# Patient Record
Sex: FEMALE | Race: WHITE | Employment: FULL TIME | URBAN - METROPOLITAN AREA
[De-identification: names, ages, dates, MRNs, and addresses within clinical notes are randomized per-mention and may not be internally consistent; named-entity substitution may affect disease eponyms.]

---

## 2023-03-28 ENCOUNTER — OFFICE VISIT (OUTPATIENT)
Dept: FAMILY MEDICINE CLINIC | Facility: CLINIC | Age: 48
End: 2023-03-28
Payer: COMMERCIAL

## 2023-03-28 VITALS
TEMPERATURE: 98 F | BODY MASS INDEX: 27.33 KG/M2 | WEIGHT: 139.19 LBS | SYSTOLIC BLOOD PRESSURE: 124 MMHG | OXYGEN SATURATION: 97 % | RESPIRATION RATE: 18 BRPM | DIASTOLIC BLOOD PRESSURE: 78 MMHG | HEART RATE: 72 BPM | HEIGHT: 60 IN

## 2023-03-28 DIAGNOSIS — J01.00 ACUTE MAXILLARY SINUSITIS, RECURRENCE NOT SPECIFIED: Primary | ICD-10-CM

## 2023-03-28 PROCEDURE — 3078F DIAST BP <80 MM HG: CPT | Performed by: NURSE PRACTITIONER

## 2023-03-28 PROCEDURE — 3074F SYST BP LT 130 MM HG: CPT | Performed by: NURSE PRACTITIONER

## 2023-03-28 PROCEDURE — 3008F BODY MASS INDEX DOCD: CPT | Performed by: NURSE PRACTITIONER

## 2023-03-28 PROCEDURE — 99203 OFFICE O/P NEW LOW 30 MIN: CPT | Performed by: NURSE PRACTITIONER

## 2023-03-28 RX ORDER — METOPROLOL SUCCINATE 25 MG/1
25 TABLET, EXTENDED RELEASE ORAL DAILY
COMMUNITY
Start: 2022-10-10

## 2023-03-28 RX ORDER — TRIAMCINOLONE ACETONIDE 1 MG/G
1 CREAM TOPICAL 2 TIMES DAILY
COMMUNITY
Start: 2022-10-10

## 2023-03-28 RX ORDER — AMOXICILLIN AND CLAVULANATE POTASSIUM 875; 125 MG/1; MG/1
1 TABLET, FILM COATED ORAL 2 TIMES DAILY
Qty: 20 TABLET | Refills: 0 | Status: SHIPPED | OUTPATIENT
Start: 2023-03-28 | End: 2023-04-07

## 2023-03-28 RX ORDER — ALPRAZOLAM 0.25 MG/1
0.25 TABLET ORAL AS DIRECTED
COMMUNITY

## 2023-03-28 RX ORDER — BUPROPION HYDROCHLORIDE 150 MG/1
150 TABLET ORAL EVERY MORNING
COMMUNITY
Start: 2021-02-18

## 2023-04-15 ENCOUNTER — APPOINTMENT (OUTPATIENT)
Dept: GENERAL RADIOLOGY | Age: 48
End: 2023-04-15
Attending: EMERGENCY MEDICINE

## 2023-04-15 ENCOUNTER — HOSPITAL ENCOUNTER (EMERGENCY)
Age: 48
Discharge: HOME | End: 2023-04-15
Attending: EMERGENCY MEDICINE

## 2023-04-15 VITALS
HEIGHT: 60 IN | HEART RATE: 68 BPM | TEMPERATURE: 98.6 F | DIASTOLIC BLOOD PRESSURE: 87 MMHG | SYSTOLIC BLOOD PRESSURE: 117 MMHG | BODY MASS INDEX: 27.57 KG/M2 | RESPIRATION RATE: 16 BRPM | WEIGHT: 140.43 LBS | OXYGEN SATURATION: 97 %

## 2023-04-15 DIAGNOSIS — R07.9 CHEST PAIN, UNSPECIFIED TYPE: Primary | ICD-10-CM

## 2023-04-15 LAB
ALBUMIN SERPL-MCNC: 3.5 G/DL (ref 3.6–5.1)
ALBUMIN/GLOB SERPL: 1.1 {RATIO} (ref 1–2.4)
ALP SERPL-CCNC: 77 UNITS/L (ref 45–117)
ALT SERPL-CCNC: 25 UNITS/L
ANION GAP SERPL CALC-SCNC: 6 MMOL/L (ref 7–19)
AST SERPL-CCNC: 24 UNITS/L
BASOPHILS # BLD: 0 K/MCL (ref 0–0.3)
BASOPHILS NFR BLD: 0 %
BILIRUB SERPL-MCNC: 0.4 MG/DL (ref 0.2–1)
BUN SERPL-MCNC: 12 MG/DL (ref 6–20)
BUN/CREAT SERPL: 18 (ref 7–25)
CALCIUM SERPL-MCNC: 8.9 MG/DL (ref 8.4–10.2)
CHLORIDE SERPL-SCNC: 111 MMOL/L (ref 97–110)
CO2 SERPL-SCNC: 24 MMOL/L (ref 21–32)
CREAT SERPL-MCNC: 0.67 MG/DL (ref 0.51–0.95)
DEPRECATED RDW RBC: 40.4 FL (ref 39–50)
EOSINOPHIL # BLD: 0.2 K/MCL (ref 0–0.5)
EOSINOPHIL NFR BLD: 4 %
ERYTHROCYTE [DISTWIDTH] IN BLOOD: 11.9 % (ref 11–15)
FASTING DURATION TIME PATIENT: ABNORMAL H
GFR SERPLBLD BASED ON 1.73 SQ M-ARVRAT: >90 ML/MIN
GLOBULIN SER-MCNC: 3.1 G/DL (ref 2–4)
GLUCOSE SERPL-MCNC: 112 MG/DL (ref 70–99)
HCT VFR BLD CALC: 39.8 % (ref 36–46.5)
HGB BLD-MCNC: 13.6 G/DL (ref 12–15.5)
IMM GRANULOCYTES # BLD AUTO: 0 K/MCL (ref 0–0.2)
IMM GRANULOCYTES # BLD: 0 %
LYMPHOCYTES # BLD: 2.1 K/MCL (ref 1–4.8)
LYMPHOCYTES NFR BLD: 32 %
MCH RBC QN AUTO: 31.8 PG (ref 26–34)
MCHC RBC AUTO-ENTMCNC: 34.2 G/DL (ref 32–36.5)
MCV RBC AUTO: 93 FL (ref 78–100)
MONOCYTES # BLD: 0.5 K/MCL (ref 0.3–0.9)
MONOCYTES NFR BLD: 8 %
NEUTROPHILS # BLD: 3.6 K/MCL (ref 1.8–7.7)
NEUTROPHILS NFR BLD: 56 %
NRBC BLD MANUAL-RTO: 0 /100 WBC
PLATELET # BLD AUTO: 250 K/MCL (ref 140–450)
POTASSIUM SERPL-SCNC: 4 MMOL/L (ref 3.4–5.1)
PROT SERPL-MCNC: 6.6 G/DL (ref 6.4–8.2)
RAINBOW EXTRA TUBES HOLD SPECIMEN: NORMAL
RAINBOW EXTRA TUBES HOLD SPECIMEN: NORMAL
RBC # BLD: 4.28 MIL/MCL (ref 4–5.2)
SODIUM SERPL-SCNC: 137 MMOL/L (ref 135–145)
TROPONIN I SERPL DL<=0.01 NG/ML-MCNC: 6 NG/L
WBC # BLD: 6.6 K/MCL (ref 4.2–11)

## 2023-04-15 PROCEDURE — 99285 EMERGENCY DEPT VISIT HI MDM: CPT

## 2023-04-15 PROCEDURE — 80053 COMPREHEN METABOLIC PANEL: CPT | Performed by: EMERGENCY MEDICINE

## 2023-04-15 PROCEDURE — 85025 COMPLETE CBC W/AUTO DIFF WBC: CPT | Performed by: EMERGENCY MEDICINE

## 2023-04-15 PROCEDURE — 71045 X-RAY EXAM CHEST 1 VIEW: CPT

## 2023-04-15 PROCEDURE — 36415 COLL VENOUS BLD VENIPUNCTURE: CPT

## 2023-04-15 PROCEDURE — 93005 ELECTROCARDIOGRAM TRACING: CPT | Performed by: EMERGENCY MEDICINE

## 2023-04-15 PROCEDURE — 84484 ASSAY OF TROPONIN QUANT: CPT | Performed by: EMERGENCY MEDICINE

## 2023-04-15 RX ORDER — CITALOPRAM HYDROBROMIDE 10 MG/1
10 TABLET ORAL
COMMUNITY

## 2023-04-15 ASSESSMENT — HEART SCORE
EKG: NORMAL
TROPONIN: EQUAL OR LESS THAN NORMAL LIMIT
HEART SCORE: 1
RISK FACTORS: 1-2 RISK FACTORS
HISTORY: SLIGHTLY SUSPICIOUS
AGE: LESS THAN OR EQUAL TO 45

## 2023-04-17 LAB
ATRIAL RATE (BPM): 59
P AXIS (DEGREES): 56
PR-INTERVAL (MSEC): 144
QRS-INTERVAL (MSEC): 82
QT-INTERVAL (MSEC): 432
QTC: 428
R AXIS (DEGREES): 30
REPORT TEXT: NORMAL
T AXIS (DEGREES): 29
VENTRICULAR RATE EKG/MIN (BPM): 59

## 2024-06-20 ENCOUNTER — HOSPITAL ENCOUNTER (OUTPATIENT)
Dept: CT IMAGING | Age: 49
Discharge: HOME | End: 2024-06-20
Attending: INTERNAL MEDICINE

## 2024-06-20 DIAGNOSIS — Z13.6 SCREENING FOR HEART DISEASE: ICD-10-CM

## 2024-06-20 LAB
POCT GLUCOSE CHOLESTECH: 82 (ref 70–99)
POCT HDL: 46 (ref 55–60)
POCT LDL: 148 (ref 0–99)
POCT TOTAL CHOLESTEROL: 218 (ref 110–200)
POCT TRIGLYCERIDES: 121 (ref 1–149)

## 2025-01-10 NOTE — PROGRESS NOTES
HPI:   Carmencita Echeverria is a 49 year old female who presents for a complete physical exam.     Patient presents with:  Physical: Reviewed Preventative/Wellness form with patient.   Patient also here for follow-up hypertension, request refill of her medication, she states she has been on metoprolol for years without changes, she states that she does have occasional lightheadedness            See ROS below for other pertinent positive and negative complaints    I reviewed her's Past Medical History, Past Surgical History, Family and Social History     Labs:   No results found for: \"WBC\", \"HGB\", \"PLT\"   No results found for: \"GLU\", \"NA\", \"K\", \"CL\", \"CO2\", \"CREATSERUM\", \"CA\", \"ALB\", \"TP\", \"ALKPHO\", \"AST\", \"ALT\", \"BILT\", \"TSH\", \"T4F\"     Lab Results   Component Value Date/Time    CHOLEST 218 (A) 06/20/2024 02:49 PM    HDL 46 (A) 06/20/2024 02:49 PM    TRIG 121 06/20/2024 02:49 PM     (A) 06/20/2024 02:49 PM         Current Outpatient Medications   Medication Sig Dispense Refill    naproxen 500 MG Oral Tab Take 1 tablet (500 mg total) by mouth 2 (two) times daily with meals.      metoprolol succinate ER 25 MG Oral Tablet 24 Hr Take 1 tablet (25 mg total) by mouth daily. 90 tablet 2    ALPRAZolam 0.25 MG Oral Tab Take 1 tablet (0.25 mg total) by mouth As Directed.      buPROPion  MG Oral Tablet 24 Hr Take 1 tablet (150 mg total) by mouth every morning.      triamcinolone 0.1 % External Cream Apply 1 Application topically 2 (two) times daily.      Citalopram Hydrobromide (CELEXA) 10 MG Oral Tab Take 1 tablet (10 mg total) by mouth daily.        Past Medical History:    Anxiety state, unspecified    Depression    Esophageal reflux    Obesity, unspecified    Other and unspecified hyperlipidemia    Unspecified essential hypertension      Past Surgical History:   Procedure Laterality Date    Other surgical history        History reviewed. No pertinent family history.  Allergies[1]   Social History:  Social History      Socioeconomic History    Marital status:    Tobacco Use    Smoking status: Never   Substance and Sexual Activity    Alcohol use: Yes     Alcohol/week: 0.8 standard drinks of alcohol     Types: 1 drink(s) per week    Drug use: No           REVIEW OF SYSTEMS:   Review of Systems   Constitutional: Negative.    HENT: Negative.     Eyes: Negative.    Respiratory: Negative.  Negative for chest tightness and shortness of breath.    Cardiovascular: Negative.  Negative for chest pain and palpitations.   Gastrointestinal: Negative.    Genitourinary: Negative.    Musculoskeletal: Negative.    Skin: Negative.    Neurological:  Positive for light-headedness. Negative for syncope, weakness and headaches.   Hematological: Negative.    Psychiatric/Behavioral: Negative.         EXAM:   /75 (BP Location: Left arm, Patient Position: Sitting, Cuff Size: adult)   Pulse 77   Ht 5' (1.524 m)   Wt 137 lb (62.1 kg)   BMI 26.76 kg/m²  Body mass index is 26.76 kg/m².  Physical Exam  Vitals and nursing note reviewed.   Constitutional:       General: She is awake. She is not in acute distress.     Appearance: Normal appearance. She is well-developed, well-groomed and normal weight. She is not ill-appearing or toxic-appearing.   HENT:      Head: Normocephalic and atraumatic.      Right Ear: Tympanic membrane, ear canal and external ear normal. There is no impacted cerumen.      Left Ear: Tympanic membrane, ear canal and external ear normal. There is no impacted cerumen.      Nose: Nose normal. No rhinorrhea.      Mouth/Throat:      Mouth: Mucous membranes are moist.      Pharynx: Oropharynx is clear. No oropharyngeal exudate or posterior oropharyngeal erythema.   Eyes:      General: No scleral icterus.        Right eye: No discharge.         Left eye: No discharge.      Extraocular Movements: Extraocular movements intact.      Conjunctiva/sclera: Conjunctivae normal.      Pupils: Pupils are equal, round, and reactive to  light.   Neck:      Thyroid: No thyroid mass, thyromegaly or thyroid tenderness.      Vascular: Normal carotid pulses. No carotid bruit, hepatojugular reflux or JVD.      Trachea: Trachea normal.   Cardiovascular:      Rate and Rhythm: Normal rate and regular rhythm.      Pulses: Normal pulses.      Heart sounds: Normal heart sounds, S1 normal and S2 normal. No murmur heard.     No S3 or S4 sounds.   Pulmonary:      Effort: Pulmonary effort is normal. No respiratory distress.      Breath sounds: Normal breath sounds and air entry. No stridor. No wheezing, rhonchi or rales.   Chest:      Chest wall: No tenderness.   Abdominal:      General: Bowel sounds are normal. There is no distension.      Palpations: Abdomen is soft. There is no hepatomegaly, splenomegaly or mass.      Tenderness: There is no abdominal tenderness. There is no guarding or rebound.      Hernia: No hernia is present.   Musculoskeletal:         General: No swelling, tenderness or deformity.      Cervical back: Normal range of motion and neck supple. No rigidity or tenderness.      Right lower leg: No edema.      Left lower leg: No edema.   Lymphadenopathy:      Cervical: No cervical adenopathy.      Right cervical: No superficial, deep or posterior cervical adenopathy.     Left cervical: No superficial, deep or posterior cervical adenopathy.      Upper Body:      Right upper body: No supraclavicular or axillary adenopathy.      Left upper body: No supraclavicular or axillary adenopathy.      Lower Body: No right inguinal adenopathy. No left inguinal adenopathy.   Skin:     General: Skin is warm and dry.      Capillary Refill: Capillary refill takes less than 2 seconds.      Coloration: Skin is not jaundiced or pale.      Findings: Lesion (Right chest wall, flat 3 x 3 cm round mild to moderately pigmented lesion with areas of clearing) present. No bruising, erythema or rash.      Nails: There is no clubbing.   Neurological:      General: No focal  deficit present.      Mental Status: She is alert and oriented to person, place, and time.      Cranial Nerves: Cranial nerves 2-12 are intact. No cranial nerve deficit, dysarthria or facial asymmetry.      Sensory: Sensation is intact. No sensory deficit.      Motor: Motor function is intact. No weakness, tremor or abnormal muscle tone.      Coordination: Coordination is intact. Coordination normal.      Deep Tendon Reflexes: Reflexes are normal and symmetric. Reflexes normal.   Psychiatric:         Attention and Perception: Attention and perception normal.         Mood and Affect: Mood and affect normal.         Speech: Speech normal.         Behavior: Behavior normal. Behavior is cooperative.         Thought Content: Thought content normal.         Cognition and Memory: Cognition and memory normal.         Judgment: Judgment normal.       ASSESSMENT AND PLAN:   1. Carmencita Echeverria is a 49 year old female who presents for a complete physical exam.    She is in good health overall.  Stress importance of exercise and healthy well balanced diet. Recommend low fat DASH diet and aerobic exercise 30 minutes 3-4 times weekly.    Health maintenance:   PAP q 3-5 years if WNL through 65-69 y/o, next due 2027 (done 2024), sees gyne, has IUD  Annual B screening Mammogram: due 8/2025   Recommend dietary calcium and Vit D (for some women supplements not indicated).  Fasting Lipids, CMP, TSH and CBC annually  Colon cancer screening: Colonoscopy: q 3 years/as recommended by GI, last 2023, colon polyps   EKG: done 2023, sinus gian, o/w NL  Ca+ score of the heart: declined  Immunizations: Flu vaccine today  Will follow-up pending results  The patient is asked to return for CPE in 1 year    Patient also here for:  Medical problem(s)/complaints as below    1. Routine medical exam  - CBC With Differential With Platelet; Future  - Comp Metabolic Panel (14); Future  - Lipid Panel; Future  - TSH W Reflex To Free T4; Future  - Fluzone  trivalent vaccine, PF 0.5mL, 6mo+ (25105)    2. Immunization due  - Fluzone trivalent vaccine, PF 0.5mL, 6mo+ (10446)    3. Essential hypertension  Patient's blood pressure borderline low, she has complaints of occasional lightheadedness, she has been on metoprolol 25 mg XL for years without problems or known side effects other than lightheadedness  See recommendation in regard to metoprolol dose and additional recommendations and follow-up below  - metoprolol succinate ER 25 MG Oral Tablet 24 Hr; Take 1 tablet (25 mg total) by mouth daily.  Dispense: 90 tablet; Refill: 2    4. Skin lesion, RT chest wall, etiology?  Has been followed by dermatology for years, was told benign, she is due for recheck now  Recommend dermatology evaluation and treatment now, consider cryotherapy with me if desired if benign  - DERM - INTERNAL    5. Lightheadedness  Mild, intermittent, discussed with patient may be due to her blood pressure, see recommendations below      Patient (and/or guardian or parent if less than 18 years old) was given instructions regarding diagnosis, management, expectations and follow up.    Patient Instructions   Will contact you/patient when the test(s), lab, result(s) are final and with further recommendations then if any changes.     Recommend:    Try decreasing your metoprolol to every other day or taking approximately 4-5 days a week, due to your low blood pressure and occasional lightheadedness.  Monitoring your blood pressure at home daily for the next 2 weeks then sending me your blood pressure record for my review.    Follow-up with me/your PCP in 6 months for a hypertension recheck, or sooner as needed.    Follow-up with the dermatologist as soon as an appointment is available.      Go to the emergency room or call 911 for any new or suddenly worsening symptoms, any signs of acute distress, respiratory distress or emergent changes.      Orders Placed This Encounter   Procedures    CBC With  Differential With Platelet    Comp Metabolic Panel (14)    Lipid Panel    TSH W Reflex To Free T4    Fluzone trivalent vaccine, PF 0.5mL, 6mo+ (11866)     Meds & Refills for this Visit:  Requested Prescriptions     Signed Prescriptions Disp Refills    metoprolol succinate ER 25 MG Oral Tablet 24 Hr 90 tablet 2     Sig: Take 1 tablet (25 mg total) by mouth daily.     Other Orders, Imaging & Consults:  INFLUENZA VACCINE, TRI, PRESERV FREE, 0.5 ML  DERM - INTERNAL    No follow-ups on file.  Follow up: as above (See AVS)    All questions were answered.  We discussed the indications, proper use, risks, and benefits of the above recommendations including any medication(s) as prescribed.  The patient (and/or guardian or parent if less than 18 years old) indicate(s) understanding and agree(s) to the above plan of care.    Denisse Gunter MD         [1]   Allergies  Allergen Reactions    Penicillins SWELLING     Swelling of face no SOB or airway compromise    Sulfa Antibiotics SWELLING and UNKNOWN

## 2025-01-11 ENCOUNTER — OFFICE VISIT (OUTPATIENT)
Dept: FAMILY MEDICINE CLINIC | Facility: CLINIC | Age: 50
End: 2025-01-11
Payer: COMMERCIAL

## 2025-01-11 ENCOUNTER — MED REC SCAN ONLY (OUTPATIENT)
Dept: FAMILY MEDICINE CLINIC | Facility: CLINIC | Age: 50
End: 2025-01-11

## 2025-01-11 VITALS
HEIGHT: 60 IN | WEIGHT: 137 LBS | DIASTOLIC BLOOD PRESSURE: 75 MMHG | BODY MASS INDEX: 26.9 KG/M2 | SYSTOLIC BLOOD PRESSURE: 108 MMHG | HEART RATE: 77 BPM

## 2025-01-11 DIAGNOSIS — L98.9 SKIN LESION: ICD-10-CM

## 2025-01-11 DIAGNOSIS — I10 ESSENTIAL HYPERTENSION: ICD-10-CM

## 2025-01-11 DIAGNOSIS — Z00.00 ROUTINE MEDICAL EXAM: Primary | ICD-10-CM

## 2025-01-11 DIAGNOSIS — Z23 IMMUNIZATION DUE: ICD-10-CM

## 2025-01-11 DIAGNOSIS — R42 LIGHTHEADEDNESS: ICD-10-CM

## 2025-01-11 PROBLEM — K63.5 POLYP OF ASCENDING COLON: Status: ACTIVE | Noted: 2023-06-28

## 2025-01-11 RX ORDER — METOPROLOL SUCCINATE 25 MG/1
25 TABLET, EXTENDED RELEASE ORAL DAILY
Qty: 90 TABLET | Refills: 2 | Status: SHIPPED | OUTPATIENT
Start: 2025-01-11

## 2025-01-11 RX ORDER — NAPROXEN 500 MG/1
500 TABLET ORAL 2 TIMES DAILY WITH MEALS
COMMUNITY
Start: 2024-12-16

## 2025-01-11 NOTE — PATIENT INSTRUCTIONS
Will contact you/patient when the test(s), lab, result(s) are final and with further recommendations then if any changes.     Recommend:    Try decreasing your metoprolol to every other day or taking approximately 4-5 days a week, due to your low blood pressure and occasional lightheadedness.  Monitoring your blood pressure at home daily for the next 2 weeks then sending me your blood pressure record for my review.    Follow-up with me/your PCP in 6 months for a hypertension recheck, or sooner as needed.    Follow-up with the dermatologist as soon as an appointment is available.      Go to the emergency room or call 911 for any new or suddenly worsening symptoms, any signs of acute distress, respiratory distress or emergent changes.

## 2025-02-08 ENCOUNTER — LAB ENCOUNTER (OUTPATIENT)
Dept: LAB | Age: 50
End: 2025-02-08
Attending: FAMILY MEDICINE
Payer: COMMERCIAL

## 2025-02-08 DIAGNOSIS — Z00.00 ROUTINE MEDICAL EXAM: ICD-10-CM

## 2025-02-08 LAB
ALBUMIN SERPL-MCNC: 4.4 G/DL (ref 3.2–4.8)
ALBUMIN/GLOB SERPL: 1.9 {RATIO} (ref 1–2)
ALP LIVER SERPL-CCNC: 68 U/L
ALT SERPL-CCNC: 24 U/L
ANION GAP SERPL CALC-SCNC: 6 MMOL/L (ref 0–18)
AST SERPL-CCNC: 26 U/L (ref ?–34)
BASOPHILS # BLD AUTO: 0.03 X10(3) UL (ref 0–0.2)
BASOPHILS NFR BLD AUTO: 0.4 %
BILIRUB SERPL-MCNC: 0.5 MG/DL (ref 0.3–1.2)
BUN BLD-MCNC: 11 MG/DL (ref 9–23)
BUN/CREAT SERPL: 12.8 (ref 10–20)
CALCIUM BLD-MCNC: 9.1 MG/DL (ref 8.7–10.4)
CHLORIDE SERPL-SCNC: 104 MMOL/L (ref 98–112)
CHOLEST SERPL-MCNC: 190 MG/DL (ref ?–200)
CO2 SERPL-SCNC: 29 MMOL/L (ref 21–32)
CREAT BLD-MCNC: 0.86 MG/DL
DEPRECATED RDW RBC AUTO: 39.2 FL (ref 35.1–46.3)
EGFRCR SERPLBLD CKD-EPI 2021: 84 ML/MIN/1.73M2 (ref 60–?)
EOSINOPHIL # BLD AUTO: 0.19 X10(3) UL (ref 0–0.7)
EOSINOPHIL NFR BLD AUTO: 2.3 %
ERYTHROCYTE [DISTWIDTH] IN BLOOD BY AUTOMATED COUNT: 11.3 % (ref 11–15)
FASTING PATIENT LIPID ANSWER: YES
FASTING STATUS PATIENT QL REPORTED: YES
GLOBULIN PLAS-MCNC: 2.3 G/DL (ref 2–3.5)
GLUCOSE BLD-MCNC: 94 MG/DL (ref 70–99)
HCT VFR BLD AUTO: 40.9 %
HDLC SERPL-MCNC: 43 MG/DL (ref 40–59)
HGB BLD-MCNC: 14 G/DL
IMM GRANULOCYTES # BLD AUTO: 0.02 X10(3) UL (ref 0–1)
IMM GRANULOCYTES NFR BLD: 0.2 %
LDLC SERPL CALC-MCNC: 127 MG/DL (ref ?–100)
LYMPHOCYTES # BLD AUTO: 2.1 X10(3) UL (ref 1–4)
LYMPHOCYTES NFR BLD AUTO: 26 %
MCH RBC QN AUTO: 32.1 PG (ref 26–34)
MCHC RBC AUTO-ENTMCNC: 34.2 G/DL (ref 31–37)
MCV RBC AUTO: 93.8 FL
MONOCYTES # BLD AUTO: 0.58 X10(3) UL (ref 0.1–1)
MONOCYTES NFR BLD AUTO: 7.2 %
NEUTROPHILS # BLD AUTO: 5.17 X10 (3) UL (ref 1.5–7.7)
NEUTROPHILS # BLD AUTO: 5.17 X10(3) UL (ref 1.5–7.7)
NEUTROPHILS NFR BLD AUTO: 63.9 %
NONHDLC SERPL-MCNC: 147 MG/DL (ref ?–130)
OSMOLALITY SERPL CALC.SUM OF ELEC: 287 MOSM/KG (ref 275–295)
PLATELET # BLD AUTO: 239 10(3)UL (ref 150–450)
POTASSIUM SERPL-SCNC: 3.9 MMOL/L (ref 3.5–5.1)
PROT SERPL-MCNC: 6.7 G/DL (ref 5.7–8.2)
RBC # BLD AUTO: 4.36 X10(6)UL
SODIUM SERPL-SCNC: 139 MMOL/L (ref 136–145)
TRIGL SERPL-MCNC: 113 MG/DL (ref 30–149)
TSI SER-ACNC: 1.62 UIU/ML (ref 0.55–4.78)
VLDLC SERPL CALC-MCNC: 20 MG/DL (ref 0–30)
WBC # BLD AUTO: 8.1 X10(3) UL (ref 4–11)

## 2025-02-08 PROCEDURE — 36415 COLL VENOUS BLD VENIPUNCTURE: CPT

## 2025-02-08 PROCEDURE — 84443 ASSAY THYROID STIM HORMONE: CPT

## 2025-02-08 PROCEDURE — 80061 LIPID PANEL: CPT

## 2025-02-08 PROCEDURE — 85025 COMPLETE CBC W/AUTO DIFF WBC: CPT

## 2025-02-08 PROCEDURE — 80053 COMPREHEN METABOLIC PANEL: CPT

## 2025-02-09 NOTE — PROGRESS NOTES
Dash Echeverria,    Attached are the results of your recently performed labs/tests:    All results are essentially within NORMAL limits.    EXCEPT:    Your lipids: Cholesterol was mildly elevated.  I recommend watching your diet (a low fat and cholesterol, well balanced Mediterranean or DASH diet) more carefully and exercising 4-5 times a week for 30+ minutes each time (or as tolerated).     Please:   Recheck as below:  In one year  Follow up:  Pending the results     Take care,     Denisse Gunter MD  2/9/2025    (Report(s) are attached, future lab/test orders or any referrals are in your chart/MyChart or will be mailed to you)

## 2025-02-24 ENCOUNTER — OFFICE VISIT (OUTPATIENT)
Dept: FAMILY MEDICINE CLINIC | Facility: CLINIC | Age: 50
End: 2025-02-24
Payer: COMMERCIAL

## 2025-02-24 ENCOUNTER — TELEPHONE (OUTPATIENT)
Dept: FAMILY MEDICINE CLINIC | Facility: CLINIC | Age: 50
End: 2025-02-24

## 2025-02-24 VITALS
HEART RATE: 81 BPM | WEIGHT: 137 LBS | DIASTOLIC BLOOD PRESSURE: 84 MMHG | SYSTOLIC BLOOD PRESSURE: 127 MMHG | HEIGHT: 60 IN | BODY MASS INDEX: 26.9 KG/M2

## 2025-02-24 DIAGNOSIS — K64.4 SKIN TAG OF ANUS: Primary | ICD-10-CM

## 2025-02-24 PROCEDURE — 99213 OFFICE O/P EST LOW 20 MIN: CPT | Performed by: FAMILY MEDICINE

## 2025-02-24 PROCEDURE — 3079F DIAST BP 80-89 MM HG: CPT | Performed by: FAMILY MEDICINE

## 2025-02-24 PROCEDURE — 3074F SYST BP LT 130 MM HG: CPT | Performed by: FAMILY MEDICINE

## 2025-02-24 PROCEDURE — 3008F BODY MASS INDEX DOCD: CPT | Performed by: FAMILY MEDICINE

## 2025-02-24 NOTE — PROGRESS NOTES
HPI:   Carmencita Echeverria is a 49 year old female who presents for:     Patient presents with:  Checkup: Possible external hemorrhoid, noticed a week ago in the shower, no pain, no bleeding, no discharge, no change in bowel habits, no weight loss, no abdominal pain        See ROS below for other pertinent positive and negative complaints.    I reviewed her's Past Medical History, Past Surgical History, Family and Social History    Labs:   Lab Results   Component Value Date/Time    WBC 8.1 02/08/2025 12:07 PM    HGB 14.0 02/08/2025 12:07 PM    .0 02/08/2025 12:07 PM      Lab Results   Component Value Date/Time    GLU 94 02/08/2025 12:07 PM     02/08/2025 12:07 PM    K 3.9 02/08/2025 12:07 PM     02/08/2025 12:07 PM    CO2 29.0 02/08/2025 12:07 PM    CREATSERUM 0.86 02/08/2025 12:07 PM    CA 9.1 02/08/2025 12:07 PM    ALB 4.4 02/08/2025 12:07 PM    TP 6.7 02/08/2025 12:07 PM    ALKPHO 68 02/08/2025 12:07 PM    AST 26 02/08/2025 12:07 PM    ALT 24 02/08/2025 12:07 PM    BILT 0.5 02/08/2025 12:07 PM    TSH 1.616 02/08/2025 12:07 PM        Lab Results   Component Value Date/Time    CHOLEST 190 02/08/2025 12:07 PM    HDL 43 02/08/2025 12:07 PM    TRIG 113 02/08/2025 12:07 PM     (H) 02/08/2025 12:07 PM    NONHDLC 147 (H) 02/08/2025 12:07 PM           Current Outpatient Medications   Medication Sig Dispense Refill    metoprolol succinate ER 25 MG Oral Tablet 24 Hr Take 1 tablet (25 mg total) by mouth daily. 90 tablet 2    ALPRAZolam 0.25 MG Oral Tab Take 1 tablet (0.25 mg total) by mouth As Directed.      buPROPion  MG Oral Tablet 24 Hr Take 1 tablet (150 mg total) by mouth every morning.      triamcinolone 0.1 % External Cream Apply 1 Application topically 2 (two) times daily.      Citalopram Hydrobromide (CELEXA) 10 MG Oral Tab Take 1 tablet (10 mg total) by mouth daily.        Past Medical History:    Anxiety state, unspecified    Depression    Esophageal reflux    Obesity, unspecified    Other  and unspecified hyperlipidemia    Unspecified essential hypertension      Past Surgical History:   Procedure Laterality Date    Other surgical history        History reviewed. No pertinent family history.  Allergies[1]   Social History:  Social History     Socioeconomic History    Marital status:    Tobacco Use    Smoking status: Never   Substance and Sexual Activity    Alcohol use: Yes     Alcohol/week: 0.8 standard drinks of alcohol     Types: 1 drink(s) per week    Drug use: No         REVIEW OF SYSTEMS:   Review of Systems   Constitutional: Negative.    HENT: Negative.     Eyes: Negative.    Respiratory: Negative.     Cardiovascular: Negative.    Gastrointestinal: Negative.  Negative for abdominal pain, anal bleeding, blood in stool, constipation, diarrhea, nausea, rectal pain and vomiting.   Genitourinary: Negative.    Musculoskeletal: Negative.    Skin: Negative.  Negative for rash.   Neurological: Negative.    Hematological: Negative.  Does not bruise/bleed easily.   Psychiatric/Behavioral: Negative.         EXAM:   /84   Pulse 81   Ht 5' (1.524 m)   Wt 137 lb (62.1 kg)   BMI 26.76 kg/m²  Body mass index is 26.76 kg/m².  Physical Exam  Vitals and nursing note reviewed.   Constitutional:       General: She is not in acute distress.     Appearance: Normal appearance. She is normal weight. She is not ill-appearing, toxic-appearing or diaphoretic.   HENT:      Head: Normocephalic.   Eyes:      Extraocular Movements: Extraocular movements intact.   Pulmonary:      Effort: Pulmonary effort is normal.   Genitourinary:     Rectum: Guaiac result positive.      Comments: Anal/rectal exam:  No evidence of internal or external hemorrhoid, approximately 3 x 4 mm pedunculated skin colored skin tag noted at external anal ring, nontender, no erythema, no bleeding, skin intact  Hemoccult test performed-negative  Musculoskeletal:         General: Normal range of motion.   Skin:     General: Skin is warm and  dry.      Capillary Refill: Capillary refill takes less than 2 seconds.      Findings: No erythema.   Neurological:      General: No focal deficit present.      Mental Status: She is alert and oriented to person, place, and time.   Psychiatric:         Mood and Affect: Mood normal.         Behavior: Behavior normal.         ASSESSMENT AND PLAN:   1. Carmencita Echeverria is a 49 year old female who presents for:    1. Skin tag of anus, likely benign skin tag at site of concern, no evidence of external or internal hemorrhoid, thrombus or suspicious appearance  Check Hemoccult in office-negative  Will observe for now, follow-up if symptoms worsen and as below    - BLOOD, OCCULT, FECAL HEMOGLOBIN DETERMIN, IMMUNOASSAY,    There are no Patient Instructions on file for this visit.    Follow up: as above (See AVS)    All questions were answered.  We discussed the indications, proper use, risks, and benefits of the above recommendations including any medication(s) as prescribed.  The patient (and/or guardian or parent if less than 18 years old) indicate(s) understanding and agree(s) to the above plan of care.    Orders Placed This Encounter   Procedures    BLOOD, OCCULT, FECAL HEMOGLOBIN DETERMIN, IMMUNOASSAY,     Meds & Refills for this Visit:  Requested Prescriptions      No prescriptions requested or ordered in this encounter     Other Orders, Imaging & Consults:  None    Denisse Gunter MD       [1]   Allergies  Allergen Reactions    Penicillins SWELLING     Swelling of face no SOB or airway compromise    Sulfa Antibiotics SWELLING and UNKNOWN

## 2025-07-09 ENCOUNTER — OFFICE VISIT (OUTPATIENT)
Dept: FAMILY MEDICINE CLINIC | Facility: CLINIC | Age: 50
End: 2025-07-09
Payer: COMMERCIAL

## 2025-07-09 ENCOUNTER — LAB ENCOUNTER (OUTPATIENT)
Dept: LAB | Age: 50
End: 2025-07-09
Attending: FAMILY MEDICINE
Payer: COMMERCIAL

## 2025-07-09 VITALS
HEIGHT: 60 IN | DIASTOLIC BLOOD PRESSURE: 77 MMHG | HEART RATE: 60 BPM | BODY MASS INDEX: 26.31 KG/M2 | SYSTOLIC BLOOD PRESSURE: 119 MMHG | WEIGHT: 134 LBS

## 2025-07-09 DIAGNOSIS — N95.1 PERIMENOPAUSE: ICD-10-CM

## 2025-07-09 DIAGNOSIS — K21.00 GASTROESOPHAGEAL REFLUX DISEASE WITH ESOPHAGITIS WITHOUT HEMORRHAGE: Primary | ICD-10-CM

## 2025-07-09 LAB
BASOPHILS # BLD AUTO: 0.02 X10(3) UL (ref 0–0.2)
BASOPHILS NFR BLD AUTO: 0.3 %
DEPRECATED RDW RBC AUTO: 41 FL (ref 35.1–46.3)
EOSINOPHIL # BLD AUTO: 0.21 X10(3) UL (ref 0–0.7)
EOSINOPHIL NFR BLD AUTO: 3 %
ERYTHROCYTE [DISTWIDTH] IN BLOOD BY AUTOMATED COUNT: 11.9 % (ref 11–15)
HCT VFR BLD AUTO: 43.4 % (ref 35–48)
HGB BLD-MCNC: 14.6 G/DL (ref 12–16)
IMM GRANULOCYTES # BLD AUTO: 0.02 X10(3) UL (ref 0–1)
IMM GRANULOCYTES NFR BLD: 0.3 %
LYMPHOCYTES # BLD AUTO: 2.05 X10(3) UL (ref 1–4)
LYMPHOCYTES NFR BLD AUTO: 29.1 %
MCH RBC QN AUTO: 31.9 PG (ref 26–34)
MCHC RBC AUTO-ENTMCNC: 33.6 G/DL (ref 31–37)
MCV RBC AUTO: 94.8 FL (ref 80–100)
MONOCYTES # BLD AUTO: 0.57 X10(3) UL (ref 0.1–1)
MONOCYTES NFR BLD AUTO: 8.1 %
NEUTROPHILS # BLD AUTO: 4.17 X10 (3) UL (ref 1.5–7.7)
NEUTROPHILS # BLD AUTO: 4.17 X10(3) UL (ref 1.5–7.7)
NEUTROPHILS NFR BLD AUTO: 59.2 %
PLATELET # BLD AUTO: 269 10(3)UL (ref 150–450)
RBC # BLD AUTO: 4.58 X10(6)UL (ref 3.8–5.3)
WBC # BLD AUTO: 7 X10(3) UL (ref 4–11)

## 2025-07-09 PROCEDURE — 3074F SYST BP LT 130 MM HG: CPT | Performed by: FAMILY MEDICINE

## 2025-07-09 PROCEDURE — 36415 COLL VENOUS BLD VENIPUNCTURE: CPT | Performed by: FAMILY MEDICINE

## 2025-07-09 PROCEDURE — 3078F DIAST BP <80 MM HG: CPT | Performed by: FAMILY MEDICINE

## 2025-07-09 PROCEDURE — 85025 COMPLETE CBC W/AUTO DIFF WBC: CPT | Performed by: FAMILY MEDICINE

## 2025-07-09 PROCEDURE — 3008F BODY MASS INDEX DOCD: CPT | Performed by: FAMILY MEDICINE

## 2025-07-09 PROCEDURE — 99214 OFFICE O/P EST MOD 30 MIN: CPT | Performed by: FAMILY MEDICINE

## 2025-07-09 RX ORDER — NORETHINDRONE ACETATE AND ETHINYL ESTRADIOL 1MG-20(21)
1 KIT ORAL DAILY
COMMUNITY
Start: 2025-04-16

## 2025-07-09 RX ORDER — OMEPRAZOLE 40 MG/1
40 CAPSULE, DELAYED RELEASE ORAL DAILY
Qty: 90 CAPSULE | Refills: 1 | Status: SHIPPED | OUTPATIENT
Start: 2025-07-09

## 2025-07-09 NOTE — PROGRESS NOTES
HPI:   Carmencita Echeverria is a 49 year old female who presents for:     Patient presents with:  Patient complains of more frequent intermittent episodes of acid reflux-in past took Tums which resulted in kidney stones  Takes gaviscon now, at point  woke up at nights w/ choking-this has resolved  She states 2 weeks ago while on vacation in [place] had daily, almost continuous acid reflux, for 5-6 days, complaining of burning sensation in the area of throat/upper esophagus mostly, has known food triggers which she avoids, patient has not seen gastroenterology for this nor has she had an EGD  She has never taken PPIs for this either    Patient would like to discuss contraceptive options, she had her Kyleena IUD removed several months ago and is now on oral contraceptives, 1/20, she states she has difficulty remembering to take it, she would like to discuss contraceptive patch      See ROS below for other pertinent positive and negative complaints.    I reviewed her's Past Medical History, Past Surgical History, Family and Social History    Labs:   Lab Results   Component Value Date/Time    WBC 7.0 07/09/2025 09:37 AM    HGB 14.6 07/09/2025 09:37 AM    .0 07/09/2025 09:37 AM      Lab Results   Component Value Date/Time    GLU 94 02/08/2025 12:07 PM     02/08/2025 12:07 PM    K 3.9 02/08/2025 12:07 PM     02/08/2025 12:07 PM    CO2 29.0 02/08/2025 12:07 PM    CREATSERUM 0.86 02/08/2025 12:07 PM    CA 9.1 02/08/2025 12:07 PM    ALB 4.4 02/08/2025 12:07 PM    TP 6.7 02/08/2025 12:07 PM    ALKPHO 68 02/08/2025 12:07 PM    AST 26 02/08/2025 12:07 PM    ALT 24 02/08/2025 12:07 PM    BILT 0.5 02/08/2025 12:07 PM    TSH 1.616 02/08/2025 12:07 PM        Lab Results   Component Value Date/Time    CHOLEST 190 02/08/2025 12:07 PM    HDL 43 02/08/2025 12:07 PM    TRIG 113 02/08/2025 12:07 PM     (H) 02/08/2025 12:07 PM    NONHDLC 147 (H) 02/08/2025 12:07 PM           Current Medications[1]   Past Medical  History[2]   Past Surgical History[3]   Family History[4]  Allergies[5]   Social History:  Social Hx on file[6]      REVIEW OF SYSTEMS:   Review of Systems   Constitutional: Negative.    HENT: Negative.     Eyes: Negative.    Respiratory: Negative.     Cardiovascular: Negative.    Gastrointestinal:  Positive for abdominal pain. Negative for anal bleeding, blood in stool, constipation, diarrhea, nausea and vomiting.   Genitourinary: Negative.    Musculoskeletal: Negative.    Skin: Negative.    Neurological: Negative.    Psychiatric/Behavioral: Negative.         EXAM:   /77   Pulse 60   Ht 5' (1.524 m)   Wt 134 lb (60.8 kg)   BMI 26.17 kg/m²  Body mass index is 26.17 kg/m².  Physical Exam  Vitals and nursing note reviewed.   Constitutional:       General: She is not in acute distress.     Appearance: Normal appearance. She is normal weight. She is not ill-appearing, toxic-appearing or diaphoretic.   HENT:      Head: Normocephalic.   Eyes:      Extraocular Movements: Extraocular movements intact.      Conjunctiva/sclera: Conjunctivae normal.   Neck:      Vascular: No carotid bruit.   Cardiovascular:      Rate and Rhythm: Normal rate and regular rhythm.      Pulses: Normal pulses.      Heart sounds: Normal heart sounds. No murmur heard.  Pulmonary:      Effort: Pulmonary effort is normal. No respiratory distress.      Breath sounds: Normal breath sounds.   Musculoskeletal:         General: No deformity.      Cervical back: Normal range of motion and neck supple. No rigidity or tenderness.      Right lower leg: No edema.      Left lower leg: No edema.   Lymphadenopathy:      Cervical: No cervical adenopathy.   Skin:     General: Skin is warm and dry.      Capillary Refill: Capillary refill takes less than 2 seconds.   Neurological:      General: No focal deficit present.      Mental Status: She is alert and oriented to person, place, and time.   Psychiatric:         Mood and Affect: Mood normal.          Behavior: Behavior normal.       ASSESSMENT AND PLAN:   1. Carmencita Echeverria is a 49 year old female who presents for:    1. Gastroesophageal reflux disease with esophagitis without hemorrhage, long-term symptoms, intermittent/waxing waning, patient not prescription medication has not had EGD in the past  Esophagitis now likely improved by history  Recommend omeprazole 40 mg daily for 4 to 6 weeks, then decrease to 20 mg daily for minimum 4 weeks then as directed by gastroenterology  Check CBC- rule out anemia  Recommend gastroenterology evaluation and treatment now, EGD?  See additional recommendations and follow up below    - CBC With Differential With Platelet    2. Perimenopause  Patient on oral contraceptives, low-dose (1/20), has difficulty remembering to take every day (was on IUD until a few months ago when oral contraceptive started).  Pt to consider stopping OCP/hormones entirely due to risks of taking hormones (especially HTN, thrombus), after being off hormones for 3 months, check hormone levels to assess for menopause-follow-up pending results  (Will discuss with -vasectomy preferred to avoid pregnancy)  See additional recommendations and follow up below    - Estradiol; Future  - FSH; Future  - Progesterone; Future  - LH (Luteinizing Hormone); Future    Patient Instructions   Will contact you/patient when the TEST (LAB-CBC today, rule out anemia and hormones-check when off OCP for 3 months) result(s) are final and with further recommendations then (if any changes).    Recommend:    Medication(s): as prescribed today-omeprazole 40 mh every day x 4-6 weeks, then decrease to 20 mg every day unless told otherwise by your Gastroenterologist      Follow-up:  With me pending the test results    With your Gastroenterologist as soon as an appointment is available    Go to the emergency room or call 911 for any new or suddenly worsening symptoms, any signs of acute distress, or emergent changes.      Follow up:  as above (See AVS)    All questions were answered.  We discussed the indications, proper use, risks, and benefits of the above recommendations including any medication(s) as prescribed.  The patient (and/or guardian or parent if less than 18 years old) indicate(s) understanding and agree(s) to the above plan of care.    Orders Placed This Encounter   Procedures    CBC With Differential With Platelet    Estradiol    FSH    Progesterone    LH (Luteinizing Hormone)     Meds & Refills for this Visit:  Requested Prescriptions     Signed Prescriptions Disp Refills    Omeprazole 40 MG Oral Capsule Delayed Release 90 capsule 1     Sig: Take 1 capsule (40 mg total) by mouth daily.     Other Orders, Imaging & Consults:  None    Denisse Gunter MD         [1]   Current Outpatient Medications   Medication Sig Dispense Refill    BLISOVI FE 1/20 1-20 MG-MCG Oral Tab Take 1 tablet by mouth daily.      Omeprazole 40 MG Oral Capsule Delayed Release Take 1 capsule (40 mg total) by mouth daily. 90 capsule 1    metoprolol succinate ER 25 MG Oral Tablet 24 Hr Take 1 tablet (25 mg total) by mouth daily. 90 tablet 2    ALPRAZolam 0.25 MG Oral Tab Take 1 tablet (0.25 mg total) by mouth As Directed.      buPROPion  MG Oral Tablet 24 Hr Take 1 tablet (150 mg total) by mouth every morning.      triamcinolone 0.1 % External Cream Apply 1 Application topically 2 (two) times daily.      Citalopram Hydrobromide (CELEXA) 10 MG Oral Tab Take 1 tablet (10 mg total) by mouth daily.     [2]   Past Medical History:   Anxiety state, unspecified    Depression    Esophageal reflux    Obesity, unspecified    Other and unspecified hyperlipidemia    Unspecified essential hypertension   [3]   Past Surgical History:  Procedure Laterality Date    Other surgical history     [4] History reviewed. No pertinent family history.  [5]   Allergies  Allergen Reactions    Penicillins SWELLING     Swelling of face no SOB or airway compromise    Sulfa  Antibiotics SWELLING and UNKNOWN   [6]   Social History  Socioeconomic History    Marital status:    Tobacco Use    Smoking status: Never   Substance and Sexual Activity    Alcohol use: Yes     Alcohol/week: 0.8 standard drinks of alcohol     Types: 1 drink(s) per week    Drug use: No

## 2025-07-09 NOTE — PATIENT INSTRUCTIONS
Will contact you/patient when the TEST (LAB-CBC today, rule out anemia and hormones-check when off OCP for 3 months) result(s) are final and with further recommendations then (if any changes).    Recommend:    Medication(s): as prescribed today-omeprazole 40 mh every day x 4-6 weeks, then decrease to 20 mg every day unless told otherwise by your Gastroenterologist      Follow-up:  With me pending the test results    With your Gastroenterologist as soon as an appointment is available    Go to the emergency room or call 911 for any new or suddenly worsening symptoms, any signs of acute distress, or emergent changes.